# Patient Record
Sex: FEMALE | Race: WHITE | NOT HISPANIC OR LATINO | Employment: OTHER
[De-identification: names, ages, dates, MRNs, and addresses within clinical notes are randomized per-mention and may not be internally consistent; named-entity substitution may affect disease eponyms.]

---

## 2017-07-01 ENCOUNTER — HEALTH MAINTENANCE LETTER (OUTPATIENT)
Age: 62
End: 2017-07-01

## 2018-07-07 ENCOUNTER — HEALTH MAINTENANCE LETTER (OUTPATIENT)
Age: 63
End: 2018-07-07

## 2019-03-09 ENCOUNTER — HEALTH MAINTENANCE LETTER (OUTPATIENT)
Age: 64
End: 2019-03-09

## 2019-03-22 ENCOUNTER — HEALTH MAINTENANCE LETTER (OUTPATIENT)
Age: 64
End: 2019-03-22

## 2019-10-03 ENCOUNTER — HEALTH MAINTENANCE LETTER (OUTPATIENT)
Age: 64
End: 2019-10-03

## 2020-11-07 ENCOUNTER — HEALTH MAINTENANCE LETTER (OUTPATIENT)
Age: 65
End: 2020-11-07

## 2021-09-05 ENCOUNTER — HEALTH MAINTENANCE LETTER (OUTPATIENT)
Age: 66
End: 2021-09-05

## 2021-12-26 ENCOUNTER — HEALTH MAINTENANCE LETTER (OUTPATIENT)
Age: 66
End: 2021-12-26

## 2022-10-23 ENCOUNTER — HEALTH MAINTENANCE LETTER (OUTPATIENT)
Age: 67
End: 2022-10-23

## 2023-04-02 ENCOUNTER — HEALTH MAINTENANCE LETTER (OUTPATIENT)
Age: 68
End: 2023-04-02

## 2023-12-26 NOTE — TELEPHONE ENCOUNTER
FUTURE VISIT INFORMATION      FUTURE VISIT INFORMATION:  Date: 3/18/24  Time: 9:30am  [place] [facility] [place] INFORMATION:  Reason for visit/diagnosis  droopy left eyelid     RECORDS REQUESTED FROM:       No recs to collect

## 2024-03-18 ENCOUNTER — OFFICE VISIT (OUTPATIENT)
Dept: OPHTHALMOLOGY | Facility: CLINIC | Age: 69
End: 2024-03-18
Payer: COMMERCIAL

## 2024-03-18 ENCOUNTER — PRE VISIT (OUTPATIENT)
Dept: OPHTHALMOLOGY | Facility: CLINIC | Age: 69
End: 2024-03-18

## 2024-03-18 DIAGNOSIS — H02.402 PTOSIS OF EYELID, LEFT: Primary | ICD-10-CM

## 2024-03-18 PROCEDURE — 92285 EXTERNAL OCULAR PHOTOGRAPHY: CPT | Mod: GC | Performed by: OPHTHALMOLOGY

## 2024-03-18 PROCEDURE — 92082 INTERMEDIATE VISUAL FIELD XM: CPT | Mod: GC | Performed by: OPHTHALMOLOGY

## 2024-03-18 PROCEDURE — 99204 OFFICE O/P NEW MOD 45 MIN: CPT | Mod: GC | Performed by: OPHTHALMOLOGY

## 2024-03-18 RX ORDER — METOPROLOL SUCCINATE 25 MG/1
25 TABLET, EXTENDED RELEASE ORAL DAILY
COMMUNITY

## 2024-03-18 ASSESSMENT — CONF VISUAL FIELD
OS_SUPERIOR_TEMPORAL_RESTRICTION: 3
OS_SUPERIOR_NASAL_RESTRICTION: 3

## 2024-03-18 ASSESSMENT — MARGIN REFLEX DISTANCE
OD_MRD1: 5
OS_MRD2: 6
OD_MRD2: 6
OS_MRD1: 2

## 2024-03-18 ASSESSMENT — VISUAL ACUITY
OS_PH_CC+: -2
OD_CC+: -2
OS_CC: 20/30
METHOD: SNELLEN - LINEAR
OD_CC: 20/30
CORRECTION_TYPE: GLASSES
OS_CC+: -1
OS_PH_CC: 20/25

## 2024-03-18 ASSESSMENT — EXTERNAL EXAM - RIGHT EYE: OD_EXAM: NORMAL

## 2024-03-18 ASSESSMENT — TONOMETRY
IOP_METHOD: ICARE
OS_IOP_MMHG: 11
OD_IOP_MMHG: 12

## 2024-03-18 ASSESSMENT — EXTERNAL EXAM - LEFT EYE: OS_EXAM: NORMAL

## 2024-03-18 NOTE — NURSING NOTE
Chief Complaints and History of Present Illnesses   Patient presents with    Consult For     Kaycee Cuellar is being seen for a consult today by recommend Dr Quentin TORRES felt Droopy left upper eye lid should be assessed.       Chief Complaint(s) and History of Present Illness(es)       Consult For              Laterality: left eye    Associated symptoms: dryness.  Negative for eye pain    Treatments tried: artificial tears    Pain scale: 0/10    Comments: Kaycee Cuellar is being seen for a consult today by recommend Dr Quentin TORRES felt Droopy left upper eye lid should be assessed.                Comments    Patient states she had lid surgery with Dr Stearns about 20 years ago. Does have Graves disease. Had upper lid surgery first and then following this had lower lids surgery right eye with graft. Both surgery's were successful. Then 4 years ago DAVID began to bother with drooping. This has gradually gotten worsened. Not seeing things as well on left side. More noticeable in the evening when fatigued.     Trinidad Orozco, COT COT 9:25 AM March 18, 2024

## 2024-03-18 NOTE — PATIENT INSTRUCTIONS
"Ptosis (Drooping Eyelids)    Eyelid ptosis (pronounced \"giovanni-sis\") is a condition in which the upper eyelid droops or sags. It can affect one or both eyes. Sometimes the eyelid droops enough to obstruct the upper field of vision and/or side vision, requiring correction. Ptosis Repair is a surgical procedure that can correct drooping eyelid(s). Depending upon the degree and cause, repair involves either resection (shortening) of a muscle in the eyelid or suspension with a muscle of the brow. Typically, the levator muscle (the major muscle responsible for elevating the upper eyelid) is shortened though an incision made along the natural crease of the lid. Excess skin weighing down the eyelid may also be removed.     Congenital Ptosis  Present from birth, the most common cause of congenital ptosis is the improper development of the levator muscle. Children may need tilt their head back or lift their eyelid with a finger to see. They may also develop amblyopia (\"lazy eye\"), strabismus (eyes that are not properly aligned), astigmatism, or blurred vision. Repair for mild to moderate congenital ptosis is generally performed between ages 3 and 5. Severe visual obstruction may require earlier treatment. ??Repair is usually performed in an outpatient surgical facility under general anesthesia so the child will not become anxious or restless during the procedure.     Acquired Ptosis  Most commonly due to age-related weakening of the levator muscle, acquired ptosis may also be caused by injury, trauma, or procedures, such as cataract surgery, which can cause weak tendons to stretch. Acquired ptosis may also be the first sign of some diseases, such as myasthenia gravis (a disorder in which the muscles become weak), or Lyndsey's syndrome (a neurological condition that indicates injury to part of the sympathetic nervous system). Ptosis Repair is usually performed in an outpatient surgical facility under anesthesia that induces a " "\"twilight\" state. Sedated consciousness is preferred so that Dr. Stearns can accurately adjust the eyelids.     Who Should Perform The Surgery?   When choosing a surgeon to perform ptosis surgery, look for a cosmetic and reconstructive surgeon who specializes in the eyelids, orbit, and tear drain system. Dr. Stearns's membership in the American Society of Ophthalmic Plastic and Reconstructive Surgery (ASOPRS) indicates he or she is not only a board certified ophthalmologist who knows the anatomy and structure of the eyelids and orbit, but also has had extensive training in ophthalmic plastic reconstructive and cosmetic surgery.    "

## 2024-03-18 NOTE — LETTER
3/18/2024         RE:  :  MRN: Kaycee Cuellar  1955  [id number]     Dear Dr. De Jesus    Thank you for asking me to see your patient, Kaycee Cuellar, for an oculoplastic   consultation.  My assessment and plan are below.  For further details, please see my attached clinic note.      FUNCTIONAL COMPLAINTS RELATED TO DROOPY EYELIDS/BROWS:  Kaycee Cuellar describes upper lids interfering with superior visual field and interfering with activities of daily living including reading, driving and watching television.   Assessment & Plan     Kaycee Cuellar is a 68 year old female with the following diagnoses:   1. Ptosis of eyelid, left       POH: s/p BUL retraction surgery (20 yr ago), RLL retraction surgery with oral mucosal graft (20 yr ago), refractive error, dry eye, diplopia (controlled in prism glasses)  PMH: HTN, HLD, Graves disease    Left upper lids ptosis repair, levator approach        Again, thank you for allowing me to participate in the care of your patient.      Sincerely,    Eddie Stearns MD  Department of Ophthalmology and Visual Neurosciences  [facility]    CC: Quentin De Jesus MD  [address] [place] [address]  Via Fax: [phone]

## 2024-03-18 NOTE — PROGRESS NOTES
Chief Complaints and History of Present Illnesses   Patient presents with    Consult For     Kaycee Cuellar is being seen for a consult today by recommend Dr Quentin TORRES felt Droopy left upper eye lid should be assessed.       Chief Complaint(s) and History of Present Illness(es)     Consult For    In left eye.  Associated symptoms include dryness.  Negative for eye pain.    Treatments tried include artificial tears.  Pain was noted as 0/10.   Additional comments: Kaycee Cuellar is being seen for a consult today   by recommend Dr Quentin TORRES felt Droopy left upper eye lid should be   assessed.    Comments    Patient states she had lid surgery with Dr Stearns about 20 years ago.   Does have Graves disease. Had upper lid surgery first and then following   this had lower lids surgery right eye with graft. Both surgery's were   successful. Then 4 years ago DAVID began to bother with drooping. This has   gradually gotten worsened. Not seeing things as well on left side. More   noticeable in the evening when fatigued.     Trinidad Orozco, COT COT 9:25 AM March 18, 2024        FUNCTIONAL COMPLAINTS RELATED TO DROOPY EYELIDS/BROWS:  Kaycee Cuellar describes upper lids interfering with superior visual field and interfering with activities of daily living including reading, driving and watching television.     EXAM:   Dominant eye left eye    MRD1: Right eye 5 mm  Left eye 2 mm  Dermatochalasis with excess skin touching eyelashes     VISUAL FIELD:  Right eye untaped:35 degrees Right eye taped:45 degrees  Left eye untaped:10 degrees Left eye taped:45 degrees    Right eye visual field improves by: 10 degrees  Left eye visual field improves by: 35 degrees    Assessment & Plan     Kaycee Cuellar is a 68 year old female with the following diagnoses:   1. Ptosis of eyelid, left       POH: s/p BUL retraction surgery (20 yr ago), RLL retraction surgery with oral mucosal graft (20 yr ago), refractive error, dry  eye, diplopia (controlled in prism glasses)  PMH: HTN, HLD, Graves disease    Left upper lids ptosis repair, levator approach          Kristin Wilkinson MD  Oculoplastic Surgery Fellow  Attending Physician Attestation:  I have seen and examined this patient with the fellow .  I have confirmed and edited as necessary the chief complaint(s), history of present illness, review of systems, relevant history, and examination findings as documented by others.  I have personally reviewed the relevant tests, images, and reports as documented above.  I have confirmed and edited as necessary the assessment and plan and agree with this note.    - Eddie Stearns MD 10:16 AM 3/18/2024    Today with Kaycee Cuellar, I reviewed the indications, risks, benefits, and alternatives of the proposed surgical procedure including, but not limited to, failure obtain the desired result  and need for additional surgery, bleeding, infection, loss of vision, loss of the eye, and the remote possibility of permanent damage to any organ system or death with the use of anesthesia.  I provided multiple opportunities for the questions, answered all questions to the best of my ability, and confirmed that my answers and my discussion were understood.     - Eddie Stearns MD 10:16 AM 3/18/2024

## 2024-03-19 ENCOUNTER — TELEPHONE (OUTPATIENT)
Dept: OPHTHALMOLOGY | Facility: CLINIC | Age: 69
End: 2024-03-19
Payer: COMMERCIAL

## 2024-03-19 NOTE — TELEPHONE ENCOUNTER
Left voicemail for patient regarding scheduling surgery with Dr. Stearns.  Provided contact number to discuss.  [phone]    Martha Moise, on 3/19/2024 at 3:43 PM

## 2024-03-20 PROBLEM — H02.402 PTOSIS OF EYELID, LEFT: Status: ACTIVE | Noted: 2024-03-18

## 2024-03-20 NOTE — TELEPHONE ENCOUNTER
Spoke with the patient and was able to confirm all scheduled information.     Patient is schedule for surgery with: Dr. Stearns    Surgery Date: 5/1     [facility]    H&P: to be completed by Primary Care team - patient instructed to schedule per patient, this will be scheduled with [employer] Partha Kingsley     Post-op:  5/13, in-person visit,      Patient will receive a phone call from pre-admission nurses 1-2 days prior to surgery with arrival time and NPO instructions.    Patient aware times are subject to change up until day before surgery.     Patient questions/concerns: N/A     Surgery packet was sent via [employer] on 3/20      Martha Moise on 3/20/2024 at 3:13 PM

## 2024-04-15 NOTE — TELEPHONE ENCOUNTER
Spoke with patient Pre Op scheduled 4/16 with [facility]       Anna C. Schoenecker on 4/15/2024 at 1:39 PM

## 2024-04-28 ENCOUNTER — ANESTHESIA EVENT (OUTPATIENT)
Dept: SURGERY | Facility: AMBULATORY SURGERY CENTER | Age: 69
End: 2024-04-28
Payer: COMMERCIAL

## 2024-04-28 RX ORDER — HYDROMORPHONE HYDROCHLORIDE 1 MG/ML
0.2 INJECTION, SOLUTION INTRAMUSCULAR; INTRAVENOUS; SUBCUTANEOUS EVERY 5 MIN PRN
Status: CANCELLED | OUTPATIENT
Start: 2024-04-28

## 2024-04-28 RX ORDER — ONDANSETRON 2 MG/ML
4 INJECTION INTRAMUSCULAR; INTRAVENOUS EVERY 30 MIN PRN
Status: CANCELLED | OUTPATIENT
Start: 2024-04-28

## 2024-04-28 RX ORDER — SODIUM CHLORIDE, SODIUM LACTATE, POTASSIUM CHLORIDE, CALCIUM CHLORIDE 600; 310; 30; 20 MG/100ML; MG/100ML; MG/100ML; MG/100ML
INJECTION, SOLUTION INTRAVENOUS CONTINUOUS
Status: CANCELLED | OUTPATIENT
Start: 2024-04-28

## 2024-04-28 RX ORDER — HYDROMORPHONE HYDROCHLORIDE 1 MG/ML
0.4 INJECTION, SOLUTION INTRAMUSCULAR; INTRAVENOUS; SUBCUTANEOUS EVERY 5 MIN PRN
Status: CANCELLED | OUTPATIENT
Start: 2024-04-28

## 2024-04-28 RX ORDER — FENTANYL CITRATE 50 UG/ML
25 INJECTION, SOLUTION INTRAMUSCULAR; INTRAVENOUS EVERY 5 MIN PRN
Status: CANCELLED | OUTPATIENT
Start: 2024-04-28

## 2024-04-28 RX ORDER — OXYCODONE HYDROCHLORIDE 5 MG/1
5 TABLET ORAL
Status: CANCELLED | OUTPATIENT
Start: 2024-04-28

## 2024-04-28 RX ORDER — ONDANSETRON 4 MG/1
4 TABLET, ORALLY DISINTEGRATING ORAL EVERY 30 MIN PRN
Status: CANCELLED | OUTPATIENT
Start: 2024-04-28

## 2024-04-28 RX ORDER — FENTANYL CITRATE 50 UG/ML
50 INJECTION, SOLUTION INTRAMUSCULAR; INTRAVENOUS EVERY 5 MIN PRN
Status: CANCELLED | OUTPATIENT
Start: 2024-04-28

## 2024-04-28 RX ORDER — OXYCODONE HYDROCHLORIDE 5 MG/1
10 TABLET ORAL
Status: CANCELLED | OUTPATIENT
Start: 2024-04-28

## 2024-04-28 RX ORDER — NALOXONE HYDROCHLORIDE 0.4 MG/ML
0.1 INJECTION, SOLUTION INTRAMUSCULAR; INTRAVENOUS; SUBCUTANEOUS
Status: CANCELLED | OUTPATIENT
Start: 2024-04-28

## 2024-04-30 NOTE — ANESTHESIA PREPROCEDURE EVALUATION
"Anesthesia Pre-Procedure Evaluation    Patient: Kaycee Cuellar   MRN: [id number] : 1955        Procedure : Procedure(s):  REPAIR, PTOSIS LEFT UPPER EYELID          Past Medical History:   Diagnosis Date    JOCELYN (generalised anxiety disorder)     Rx paxil    Grave's disease     Prediabetes       Past Surgical History:   Procedure Laterality Date    DENTAL SURGERY      wisdom teeth    EYE SURGERY      due to Graves disease     KNEE SURGERY      BL replacements     oophoectomy      due to teratoma    unbilical hernia repair        Allergies   Allergen Reactions    Erythromycin       Social History     Tobacco Use    Smoking status: Former     Types: Cigarettes    Smokeless tobacco: Never   Substance Use Topics    Alcohol use: No      Wt Readings from Last 1 Encounters:   03/16/15 101.5 kg (223 lb 11.2 oz)              OUTSIDE LABS:  CBC:   Lab Results   Component Value Date    WBC 5.9 2010    WBC 5.5 2006    HGB 13.0 2010    HGB 13.4 2007    HCT 38.5 2010    HCT 42.5 2006     2010     2006     BMP:   Lab Results   Component Value Date     2015     2011    POTASSIUM 3.9 2015    POTASSIUM 4.3 2011    CHLORIDE 101 2015    CHLORIDE 104 2011    CO2 28 2015    CO2 28 2011    BUN 22 2015    BUN 20 2011    CR 0.84 2015    CR 0.77 2011    GLC 90 2015     (H) 2011     COAGS:   Lab Results   Component Value Date    PTT 31 2006    INR 1.02 2006     POC: No results found for: \"BGM\", \"HCG\", \"HCGS\"  HEPATIC:   Lab Results   Component Value Date    ALBUMIN 4.0 2015    PROTTOTAL 8.2 2015    ALT 25 2015    AST 20 2015    ALKPHOS 100 2015    BILITOTAL 0.6 2015     OTHER:   Lab Results   Component Value Date    A1C 5.7 2015    CHRIS 9.4 2015    TSH 1.77 2015    T4 1.33 2008       Anesthesia " Plan    ASA Status:  2       Anesthesia Type: MAC.     - Reason for MAC: straight local not clinically adequate   Induction: Intravenous, Propofol.   Maintenance: TIVA.        Consents    Anesthesia Plan(s) and associated risks, benefits, and realistic alternatives discussed. Questions answered and patient/representative(s) expressed understanding.     - Discussed: Risks, Benefits and Alternatives for BOTH SEDATION and the PROCEDURE were discussed     - Discussed with:       - Extended Intubation/Ventilatory Support Discussed: No.      - Patient is DNR/DNI Status: No     Use of blood products discussed: No .     Postoperative Care    Pain management: IV analgesics, Oral pain medications, Multi-modal analgesia.   PONV prophylaxis: Dexamethasone or Solumedrol, Ondansetron (or other 5HT-3), Background Propofol Infusion     Comments:               Marty Frey MD    I have reviewed the pertinent notes and labs in the chart from the past 30 days and (re)examined the patient.  Any updates or changes from those notes are reflected in this note.

## 2024-05-01 ENCOUNTER — ANESTHESIA (OUTPATIENT)
Dept: SURGERY | Facility: AMBULATORY SURGERY CENTER | Age: 69
End: 2024-05-01
Payer: COMMERCIAL

## 2024-05-01 ENCOUNTER — HOSPITAL ENCOUNTER (OUTPATIENT)
Facility: AMBULATORY SURGERY CENTER | Age: 69
Discharge: HOME | End: 2024-05-01
Attending: OPHTHALMOLOGY
Payer: COMMERCIAL

## 2024-05-01 VITALS
BODY MASS INDEX: 36.32 KG/M2 | RESPIRATION RATE: 16 BRPM | WEIGHT: 205 LBS | DIASTOLIC BLOOD PRESSURE: 68 MMHG | HEART RATE: 63 BPM | TEMPERATURE: 98.6 F | OXYGEN SATURATION: 95 % | HEIGHT: 63 IN | SYSTOLIC BLOOD PRESSURE: 131 MMHG

## 2024-05-01 DIAGNOSIS — H02.402 PTOSIS OF EYELID, LEFT: Primary | ICD-10-CM

## 2024-05-01 PROCEDURE — 67901 REPAIR EYELID DEFECT: CPT | Performed by: NURSE ANESTHETIST, CERTIFIED REGISTERED

## 2024-05-01 PROCEDURE — 67901 REPAIR EYELID DEFECT: CPT | Performed by: STUDENT IN AN ORGANIZED HEALTH CARE EDUCATION/TRAINING PROGRAM

## 2024-05-01 PROCEDURE — 67904 REPAIR EYELID DEFECT: CPT | Mod: LT

## 2024-05-01 RX ORDER — ONDANSETRON 2 MG/ML
INJECTION INTRAMUSCULAR; INTRAVENOUS PRN
Status: DISCONTINUED | OUTPATIENT
Start: 2024-05-01 | End: 2024-05-01

## 2024-05-01 RX ORDER — NEOMYCIN POLYMYXIN B SULFATES AND DEXAMETHASONE 3.5; 10000; 1 MG/ML; [USP'U]/ML; MG/ML
SUSPENSION/ DROPS OPHTHALMIC
Qty: 5 ML | Refills: 0 | Status: SHIPPED | OUTPATIENT
Start: 2024-05-01

## 2024-05-01 RX ORDER — ACETAMINOPHEN 325 MG/1
975 TABLET ORAL ONCE
Status: COMPLETED | OUTPATIENT
Start: 2024-05-01 | End: 2024-05-01

## 2024-05-01 RX ORDER — LIDOCAINE HYDROCHLORIDE 20 MG/ML
INJECTION, SOLUTION INFILTRATION; PERINEURAL PRN
Status: DISCONTINUED | OUTPATIENT
Start: 2024-05-01 | End: 2024-05-01

## 2024-05-01 RX ORDER — PROPOFOL 10 MG/ML
INJECTION, EMULSION INTRAVENOUS PRN
Status: DISCONTINUED | OUTPATIENT
Start: 2024-05-01 | End: 2024-05-01

## 2024-05-01 RX ORDER — LIDOCAINE 40 MG/G
CREAM TOPICAL
Status: DISCONTINUED | OUTPATIENT
Start: 2024-05-01 | End: 2024-05-02 | Stop reason: HOSPADM

## 2024-05-01 RX ORDER — LIDOCAINE HYDROCHLORIDE AND EPINEPHRINE 10; 10 MG/ML; UG/ML
INJECTION, SOLUTION INFILTRATION; PERINEURAL PRN
Status: DISCONTINUED | OUTPATIENT
Start: 2024-05-01 | End: 2024-05-01 | Stop reason: HOSPADM

## 2024-05-01 RX ORDER — BACITRACIN 500 [USP'U]/G
OINTMENT OPHTHALMIC
Qty: 3.5 G | Refills: 0 | Status: SHIPPED | OUTPATIENT
Start: 2024-05-01

## 2024-05-01 RX ORDER — TETRACAINE HYDROCHLORIDE 5 MG/ML
SOLUTION OPHTHALMIC PRN
Status: DISCONTINUED | OUTPATIENT
Start: 2024-05-01 | End: 2024-05-01 | Stop reason: HOSPADM

## 2024-05-01 RX ORDER — SODIUM CHLORIDE, SODIUM LACTATE, POTASSIUM CHLORIDE, CALCIUM CHLORIDE 600; 310; 30; 20 MG/100ML; MG/100ML; MG/100ML; MG/100ML
INJECTION, SOLUTION INTRAVENOUS CONTINUOUS
Status: DISCONTINUED | OUTPATIENT
Start: 2024-05-01 | End: 2024-05-02 | Stop reason: HOSPADM

## 2024-05-01 RX ADMIN — SODIUM CHLORIDE, SODIUM LACTATE, POTASSIUM CHLORIDE, CALCIUM CHLORIDE: 600; 310; 30; 20 INJECTION, SOLUTION INTRAVENOUS at 12:30

## 2024-05-01 RX ADMIN — LIDOCAINE HYDROCHLORIDE 100 MG: 20 INJECTION, SOLUTION INFILTRATION; PERINEURAL at 13:42

## 2024-05-01 RX ADMIN — ACETAMINOPHEN 975 MG: 325 TABLET ORAL at 12:30

## 2024-05-01 RX ADMIN — ONDANSETRON 4 MG: 2 INJECTION INTRAMUSCULAR; INTRAVENOUS at 13:42

## 2024-05-01 RX ADMIN — PROPOFOL 50 MG: 10 INJECTION, EMULSION INTRAVENOUS at 13:42

## 2024-05-01 NOTE — ANESTHESIA CARE TRANSFER NOTE
Patient: Kaycee Cuellar    Procedure: Procedure(s):  REPAIR, PTOSIS LEFT UPPER EYELID       Diagnosis: Ptosis of eyelid, left [H02.402]  Diagnosis Additional Information: No value filed.    Anesthesia Type:   MAC     Note:    Oropharynx: oropharynx clear of all foreign objects and spontaneously breathing  Level of Consciousness: awake  Oxygen Supplementation: room air    Independent Airway: airway patency satisfactory and stable  Dentition: dentition unchanged  Vital Signs Stable: post-procedure vital signs reviewed and stable  Report to RN Given: handoff report given  Patient transferred to: Phase II    Handoff Report: Identifed the Patient, Identified the Reponsible Provider, Reviewed the pertinent medical history, Discussed the surgical course, Reviewed Intra-OP anesthesia mangement and issues during anesthesia, Set expectations for post-procedure period and Allowed opportunity for questions and acknowledgement of understanding      Vitals:  Vitals Value Taken Time   /75 05/01/24 1409   Temp 37.1  C (98.8  F) 05/01/24 1409   Pulse 62 05/01/24 1409   Resp 16 05/01/24 1409   SpO2 95 % 05/01/24 1409       Electronically Signed By: UMU Rizvi CRNA  May 1, 2024  2:11 PM

## 2024-05-01 NOTE — BRIEF OP NOTE
[facility]    Brief Operative Note    Pre-operative diagnosis: Ptosis of eyelid, left [H02.402]  Post-operative diagnosis Same as pre-operative diagnosis    Procedure: REPAIR, PTOSIS LEFT UPPER EYELID, Left - Eye    Surgeon: Surgeons and Role:     * Eddie Stearns MD - Primary     * Roberto Cotter MD - Resident - Assisting  Anesthesia: MAC with Local   Estimated Blood Loss: Minimal    Drains: None  Specimens: * No specimens in log *  Findings:   None.  Complications: None.  Implants: * No implants in log *    Roberto Cotter MD  Resident Physician, PGY-2  Department of Ophthalmology

## 2024-05-01 NOTE — DISCHARGE INSTRUCTIONS
Post-operative Instructions    Ophthalmic Plastic and Reconstructive Surgery  Eddie Stearns M.D.  Kristin Wilkinson M.D.    All instructions apply to the operated eye(s) or eyelid(s)      What to expect after surgery:  There will be some swelling, bruising, and likely a black eye (even into the lower eyelids and cheeks). Also expect crusting and discharge from the eye and/or incisions.   A small amount of surface bleeding is normal for the first 48 hours after surgery.  You may notice some bloody tears for the first few days after surgery. This is normal.  Your eye(s) and eyelid(s) may be painful and tender. This is normal after surgery. Use the pain medication as prescribed. If your pain does not improve despite the medication, contact the office.    Wound care and personal care:  Apply ice compresses 15 minutes on 15 minutes off while awake for the first 2 days after surgery, then switch to warm compresses 4 times a day until seen by your physician.   For warm packs you can place a cup of dry uncooked rice in a clean cotton sock. Place sock in microwave 30 seconds to one minute. Next place the warm sock into a plastic bag and wrap the bag with clean warm wet washcloth and place over operated eye.    You may shower or wash your hair the day after surgery. Do not bathe or go swimming for 1 week to prevent contamination of your wounds.  Do not apply make-up to the eyes or eyelids for 2 weeks after surgery.    Activity restrictions and driving:  Avoid heavy lifting, bending, exercise or strenuous activity for 1 week after surgery.  You may resume other activities and return to work as tolerated.  You may not resume driving until have you stopped using narcotic pain medications(such as Norco, Percocet, Tylenol #3).    Medications:  Restart all your regular home medications and eye drops today. If you take Plavix or Aspirin on a regular basis, wait for 3 days after your surgery before restarting these in order to  decrease the risk of bleeding complications.  Avoid aspirin and aspirin-like medications (Motrin, Aleve, Ibuprofen, [place] etc) for 5 days to reduce the risk of bleeding. You may take Tylenol (acetaminophen) for pain.  In addition to your home medications, take the following post-operative medications as prescribed by your physician:  Apply antibiotic ointment (bacitracin) to all sutures three times a day, and into the operated eye(s) at night.   Instill eye drops (Maxitrol) 3 times a day for 10 days.  Take scheduled extra strength Tylenol for pain.  You may take 1 to 2 pain pills (norco or oxycodone as prescribed) as needed for breakthrough pain up to every 6 hours.  The pain pills may make you drowsy. You must not drive a car, operate heavy machinery or drink alcohol while taking them.  The pain pills may cause constipation and nausea. Take them with some food to prevent a stomach upset. If you continue to experience nausea, call your physician.    WARNING: All the prescription pain medications listed above contain Tylenol (acetaminophen). You must not take more than 4,000 mg of acetaminophen per 24-hour period. This is equivalent to 6 tablets of Darvocet, 8 tablets of Vicodin, or 12 tablets of Norco, Percocet or Tylenol #3. If you take other over-the-counter medications containing acetaminophen, you must take the amount of acetaminophen into account and reduce the number of prescribed pain pills accordingly.    Contact information and follow-up:  Return to the Eye Clinic for a follow-up appointment with your physician as  scheduled. If no appointment has been scheduled, call [phone] for an  appointment with Dr. Stearns within 1 to 2 weeks from your date of surgery.  -     Please email a few photos of your eye(s) or other operative site(s) to [email or web address] prior to your follow up visit.    For severe pain, bleeding, or loss of vision, call the Eye Clinic at [phone].  After hours or on  weekends and holidays, call [phone] and ask to speak with the ophthalmologist on call.       [facility]  Home Care Following Anesthesia  For 24 hours after surgery:  Get plenty of rest.  A responsible adult must stay with you for at least 24 hours after you leave the surgery center.  Do not drive or use heavy equipment.  If you have weakness or tingling, don't drive or use heavy equipment until this feeling goes away.   Do not drink alcohol.   Avoid strenuous or risky activities.  Ask for help when climbing stairs.  You may feel lightheaded.  IF so, sit for a few minutes before standing.  Have someone help you get up.   If you have nausea (feel sick to your stomach): Drink only clear liquids such as apple juice, ginger ale, broth or 7-Up.  Rest may also help.  Be sure to drink enough fluids.  Move to a regular diet as you feel able.   You may have a slight fever.  Call the doctor if your fever is over 100 F (37.7 C) (taken under the tongue) or lasts longer than 24 hours.  You may have a dry mouth, a sore throat, muscle aches or trouble sleeping. These should go away after 24 hours.  Do not make important or legal decisions.   It is recommended to avoid smoking.               Tips for taking pain medications  To get the best pain relief possible, remember these points:  Take pain medications as directed, before pain becomes severe.  Pain medication can upset your stomach: taking it with food may help.  Constipation is a common side effect of pain medication. Drink plenty of  fluids.  Eat foods high in fiber. Take a stool softener if recommended by your doctor or pharmacist.  Do not drink alcohol, drive or operate machinery while taking pain medications.  Ask about other ways to control pain, such as with heat, ice or relaxation.    Tylenol/Acetaminophen Consumption    If you feel your pain relief is insufficient, you may take Tylenol/Acetaminophen in addition to your  narcotic pain medication.   Be careful not to exceed 4,000 mg of Tylenol/Acetaminophen in a 24 hour period from all sources.  If you are taking extra strength Tylenol/acetaminophen (500 mg), the maximum dose is 8 tablets in 24 hours.  If you are taking regular strength acetaminophen (325 mg), the maximum dose is 12 tablets in 24 hours.  Tylenol 975 mg given at 12:30 pm.   Ok to take more after 6:30 pm.      Call a doctor for any of the following:  Signs of infection (fever, growing tenderness at the surgery site, a large amount of drainage or bleeding, severe pain, foul-smelling drainage, redness, swelling).  It has been over 8 to 10 hours since surgery and you are still not able to urinate (pass water).  Headache for over 24 hours.  Numbness, tingling or weakness the day after surgery (if you had spinal anesthesia).  Signs of Covid-19 infection (temperature over 100 degrees, shortness of breath, cough, loss of taste/smell, generalized body aches, persistent headache, chills, sore throat, nausea/vomiting/diarrhea)  Your doctor is:  Dr. Eddie Stearns, Ophthalmology: [phone]                    Or dial [phone] and ask for the resident on call for:  Ophthalmology  For emergency care, call the:  [facility]:  [phone] (TTY for hearing impaired: [phone])

## 2024-05-02 NOTE — OP NOTE
PREOPERATIVE DIAGNOSIS: Ptosis, left upper lid.   POSTOPERATIVE DIAGNOSIS:  Ptosis,left upper lid.   PROCEDURE:Left  upper eyelid  ptosis repair by external levator resection.   SURGEON: Eddie Stearns MD   ASSISTANT: Kristin Wilkinson MD, FLIP and Roberto Cotter MD     ANESTHESIA: Monitored with local infiltration of 1% lidocaine with epinephrine [id number].   COMPLICATIONS: None.   ESTIMATED BLOOD LOSS: Less than 5 mL.   HISTORY: Kaycee Cuellar  presented with ptosis of left upper lid interfering with the superior visual field and activities of daily living. After the risks, benefits and alternatives to the proposed procedure were explained, informed consent was obtained.   DESCRIPTION OF PROCEDURE: Kaycee Cuellar  was brought to the operating room and placed supine on the operating table. Intravenous sedation was given. The left upper lid crease was marked with a marking pen and infiltrated with local anesthetic. The area was prepped and draped in the typical sterile ophthalmic fashion. Attention was directed to the left  side. A lid crease incision was made with a 15 blade and dissection carried down to the orbicularis with high temperature cautery. The orbital septum was opened horizontally. The levator aponeurosis was identified and dissected from the superior tarsal border and the underlying Palumbo's muscle and advanced with a 5-0 Mersilene suture to bring the lid into a normal height and contour. The suture was passed to partial thickness through the superior tarsal plate and then each end brought underneath the levator aponeurosis. The patient was asked to open the eye and the suture adjusted for height and contour. The inferior orbicularis was secured to the levator aponeurosis with 6-0 Vicryl sutures. The skin was closed with with running 6-0 plain gut sutures.  Ophthalmic ointment was applied to the incision. The patient tolerated the procedure well and left the operating room in stable condition.      ÁNGEL GOINS MD

## 2024-05-13 ENCOUNTER — OFFICE VISIT (OUTPATIENT)
Dept: OPHTHALMOLOGY | Facility: CLINIC | Age: 69
End: 2024-05-13
Payer: COMMERCIAL

## 2024-05-13 DIAGNOSIS — H02.402 PTOSIS OF EYELID, LEFT: Primary | ICD-10-CM

## 2024-05-13 DIAGNOSIS — Z98.890 POSTOPERATIVE EYE STATE: ICD-10-CM

## 2024-05-13 PROCEDURE — 99024 POSTOP FOLLOW-UP VISIT: CPT | Performed by: OPHTHALMOLOGY

## 2024-05-13 ASSESSMENT — VISUAL ACUITY
OD_CC: 20/30
OS_PH_CC: 20/25
OS_PH_CC+: -2
METHOD: SNELLEN - LINEAR
OS_CC+: -1
CORRECTION_TYPE: GLASSES
OS_CC: 20/40

## 2024-05-13 ASSESSMENT — SLIT LAMP EXAM - LIDS
COMMENTS: INCISIONS CLEAN/DRY/INTACT
COMMENTS: NORMAL

## 2024-05-13 ASSESSMENT — TONOMETRY
OS_IOP_MMHG: 11
OD_IOP_MMHG: 10
IOP_METHOD: ICARE

## 2024-05-13 ASSESSMENT — EXTERNAL EXAM - LEFT EYE: OS_EXAM: PERIORBITAL EDEMA

## 2024-05-13 ASSESSMENT — EXTERNAL EXAM - RIGHT EYE: OD_EXAM: NORMAL

## 2024-05-13 NOTE — NURSING NOTE
Chief Complaints and History of Present Illnesses   Patient presents with    Follow Up     S/p REPAIR, PTOSIS LEFT UPPER EYELID 5/1/24     Chief Complaint(s) and History of Present Illness(es)       Follow Up              Associated symptoms: Negative for eye pain and discharge    Treatments tried: ointment    Pain scale: 0/10    Comments: S/p REPAIR, PTOSIS LEFT UPPER EYELID 5/1/24              Comments    No pain with procedure.   Feel like may have reacted to bacitracin stopped it after 3 day due to increase red.   Had some systane ointment so started to use this rather.   Still some swelling but lid is a little bit lower right now then hoped. Questions if when swelling reducing is it possible the lid may come up further as well.     Trinidad Orozco, COT COT 3:25 PM May 13, 2024

## 2024-05-13 NOTE — PROGRESS NOTES
Chief Complaints and History of Present Illnesses   Patient presents with    Follow Up     S/p REPAIR, PTOSIS LEFT UPPER EYELID 5/1/24     Chief Complaint(s) and History of Present Illness(es)     Follow Up    Associated symptoms include Negative for eye pain and discharge.    Treatments tried include ointment.  Pain was noted as 0/10. Additional   comments: S/p REPAIR, PTOSIS LEFT UPPER EYELID 5/1/24           Comments    No pain with procedure.   Feel like may have reacted to bacitracin stopped it after 3 day due to   increase red.   Had some systane ointment so started to use this rather.   Still some swelling but lid is a little bit lower right now then hoped.   Questions if when swelling reducing is it possible the lid may come up   further as well.     Trinidad Orozco, COT COT 3:25 PM May 13, 2024          Assessment & Plan     Kaycee Cuellar is a 69 year old female with the following diagnoses:   1. Ptosis of eyelid, left    2. Postoperative eye state    Continue antibiotic ointment or bland lubricating ointment (eg vaseline or aquaphor) to the incision(s) two times a day.  Gently massage along the incision(s) two times a day.  Use warm soaks over the incision(s) four times a day until swelling and bruises resolve.  Return to clinic 2 months                   Attending Physician Attestation:  Complete documentation of historical and exam elements from today's encounter can be found in the full encounter summary report (not reduplicated in this progress note).  I personally obtained the chief complaint(s) and history of present illness.  I confirmed and edited as necessary the review of systems, past medical/surgical history, family history, social history, and examination findings as documented by others; and I examined the patient myself.  I personally reviewed the relevant tests, images, and reports as documented above.  I formulated and edited as necessary the assessment and plan and discussed the  findings and management plan with the patient and family. I personally reviewed the ophthalmic test(s) associated with this encounter, agree with the interpretation(s) as documented and have edited the corresponding report(s) as necessary.   -Eddie Stearns MD  3:38 PM 5/13/2024

## 2024-06-08 ENCOUNTER — HEALTH MAINTENANCE LETTER (OUTPATIENT)
Age: 69
End: 2024-06-08

## 2024-07-15 ENCOUNTER — OFFICE VISIT (OUTPATIENT)
Dept: OPHTHALMOLOGY | Facility: CLINIC | Age: 69
End: 2024-07-15
Payer: COMMERCIAL

## 2024-07-15 DIAGNOSIS — Z98.890 POSTOPERATIVE EYE STATE: Primary | ICD-10-CM

## 2024-07-15 PROCEDURE — 99024 POSTOP FOLLOW-UP VISIT: CPT | Performed by: OPHTHALMOLOGY

## 2024-07-15 ASSESSMENT — MARGIN REFLEX DISTANCE
OS_MRD1: 3
OD_MRD1: 4

## 2024-07-15 ASSESSMENT — TONOMETRY
IOP_METHOD: ICARE
OD_IOP_MMHG: 12
OS_IOP_MMHG: 13

## 2024-07-15 ASSESSMENT — LAGOPHTHALMOS
OS_LAGOPHTHALMOS: 0
OD_LAGOPHTHALMOS: 0

## 2024-07-15 ASSESSMENT — VISUAL ACUITY
METHOD: SNELLEN - LINEAR
OD_CC: 20/30
CORRECTION_TYPE: GLASSES
OD_CC+: -2
OS_CC: 20/25

## 2024-07-15 ASSESSMENT — SLIT LAMP EXAM - LIDS
COMMENTS: NORMAL
COMMENTS: HEALED WELL

## 2024-07-15 ASSESSMENT — EXTERNAL EXAM - LEFT EYE: OS_EXAM: NORMAL

## 2024-07-15 ASSESSMENT — EXTERNAL EXAM - RIGHT EYE: OD_EXAM: NORMAL

## 2024-07-15 NOTE — NURSING NOTE
Chief Complaints and History of Present Illnesses   Patient presents with    Post Op (Ophthalmology) Left Eye     Post left upper eyelid ptosis repair of external levator resection on 05/01/2024     Chief Complaint(s) and History of Present Illness(es)       Post Op (Ophthalmology) Left Eye              Laterality: left eye    Course: gradually improving    Associated symptoms: Negative for dryness, eye pain, tearing and photophobia    Treatments tried: no treatments    Pain scale: 0/10    Comments: Post left upper eyelid ptosis repair of external levator resection on 05/01/2024              Comments    She states that her vision has seemed stable in both eyes since her last eye exam.  Patient denies having any eye discomfort.    Kylah Johnson, COT 11:29 AM  July 15, 2024

## 2024-07-15 NOTE — PROGRESS NOTES
Chief Complaints and History of Present Illnesses   Patient presents with    Post Op (Ophthalmology) Left Eye     Post left upper eyelid ptosis repair of external levator resection on 05/01/2024     Chief Complaint(s) and History of Present Illness(es)     Post Op (Ophthalmology) Left Eye    In left eye.  Since onset it is gradually improving.  Associated symptoms   include Negative for dryness, eye pain, tearing and photophobia.    Treatments tried include no treatments.  Pain was noted as 0/10.   Additional comments: Post left upper eyelid ptosis repair of external   levator resection on 05/01/2024           Comments    She states that her vision has seemed stable in both eyes since her last   eye exam.  Patient denies having any eye discomfort.    Kylah ULYSSES Johnson 11:29 AM  July 15, 2024          Assessment & Plan     Kaycee Cuellar is a 69 year old female with the following diagnoses:   1. Postoperative eye state       Healed   Return to clinic as needed                Attending Physician Attestation:  Complete documentation of historical and exam elements from today's encounter can be found in the full encounter summary report (not reduplicated in this progress note).  I personally obtained the chief complaint(s) and history of present illness.  I confirmed and edited as necessary the review of systems, past medical/surgical history, family history, social history, and examination findings as documented by others; and I examined the patient myself.  I personally reviewed the relevant tests, images, and reports as documented above.  I formulated and edited as necessary the assessment and plan and discussed the findings and management plan with the patient and family. I personally reviewed the ophthalmic test(s) associated with this encounter, agree with the interpretation(s) as documented and have edited the corresponding report(s) as necessary.   -Eddie Stearns MD  11:36 AM 7/15/2024

## 2025-06-15 ENCOUNTER — HEALTH MAINTENANCE LETTER (OUTPATIENT)
Age: 70
End: 2025-06-15

## 2025-07-06 ENCOUNTER — HEALTH MAINTENANCE LETTER (OUTPATIENT)
Age: 70
End: 2025-07-06

## (undated) DEVICE — ESU HIGH TEMP LOOP TIP AA03

## (undated) DEVICE — LINEN TOWEL PACK X5 5464

## (undated) DEVICE — SOL WATER IRRIG 500ML BOTTLE 2F7113

## (undated) DEVICE — PACK MINOR EYE CUSTOM ASC

## (undated) DEVICE — EYE PREP BETADINE 5% SOLUTION 30ML 0065-0411-30

## (undated) DEVICE — GLOVE BIOGEL PI MICRO SZ 7.5 48575

## (undated) RX ORDER — PROPOFOL 10 MG/ML
INJECTION, EMULSION INTRAVENOUS
Status: DISPENSED
Start: 2024-05-01

## (undated) RX ORDER — ACETAMINOPHEN 325 MG/1
TABLET ORAL
Status: DISPENSED
Start: 2024-05-01

## (undated) RX ORDER — ONDANSETRON 2 MG/ML
INJECTION INTRAMUSCULAR; INTRAVENOUS
Status: DISPENSED
Start: 2024-05-01

## (undated) RX ORDER — TRANEXAMIC ACID 100 MG/ML
INJECTION, SOLUTION INTRAVENOUS
Status: DISPENSED
Start: 2024-05-01